# Patient Record
Sex: MALE | Race: WHITE | NOT HISPANIC OR LATINO | URBAN - METROPOLITAN AREA
[De-identification: names, ages, dates, MRNs, and addresses within clinical notes are randomized per-mention and may not be internally consistent; named-entity substitution may affect disease eponyms.]

---

## 2017-12-04 ENCOUNTER — EMERGENCY (EMERGENCY)
Facility: HOSPITAL | Age: 42
LOS: 1 days | End: 2017-12-04
Attending: EMERGENCY MEDICINE
Payer: MEDICAID

## 2017-12-04 VITALS
OXYGEN SATURATION: 99 % | HEART RATE: 96 BPM | TEMPERATURE: 98 F | WEIGHT: 175.93 LBS | HEIGHT: 74 IN | DIASTOLIC BLOOD PRESSURE: 75 MMHG | SYSTOLIC BLOOD PRESSURE: 105 MMHG | RESPIRATION RATE: 20 BRPM

## 2017-12-04 LAB
ALBUMIN SERPL ELPH-MCNC: 4.5 G/DL — SIGNIFICANT CHANGE UP (ref 3.3–5.2)
ALP SERPL-CCNC: 66 U/L — SIGNIFICANT CHANGE UP (ref 40–120)
ALT FLD-CCNC: 22 U/L — SIGNIFICANT CHANGE UP
AMPHET UR-MCNC: POSITIVE
ANION GAP SERPL CALC-SCNC: 14 MMOL/L — SIGNIFICANT CHANGE UP (ref 5–17)
APAP SERPL-MCNC: <7.5 UG/ML — LOW (ref 10–26)
APPEARANCE UR: CLEAR — SIGNIFICANT CHANGE UP
AST SERPL-CCNC: 19 U/L — SIGNIFICANT CHANGE UP
BACTERIA # UR AUTO: ABNORMAL
BARBITURATES UR SCN-MCNC: NEGATIVE — SIGNIFICANT CHANGE UP
BASOPHILS # BLD AUTO: 0 K/UL — SIGNIFICANT CHANGE UP (ref 0–0.2)
BASOPHILS NFR BLD AUTO: 0.5 % — SIGNIFICANT CHANGE UP (ref 0–2)
BENZODIAZ UR-MCNC: NEGATIVE — SIGNIFICANT CHANGE UP
BILIRUB SERPL-MCNC: 0.6 MG/DL — SIGNIFICANT CHANGE UP (ref 0.4–2)
BILIRUB UR-MCNC: NEGATIVE — SIGNIFICANT CHANGE UP
BUN SERPL-MCNC: 18 MG/DL — SIGNIFICANT CHANGE UP (ref 8–20)
CALCIUM SERPL-MCNC: 10 MG/DL — SIGNIFICANT CHANGE UP (ref 8.6–10.2)
CHLORIDE SERPL-SCNC: 100 MMOL/L — SIGNIFICANT CHANGE UP (ref 98–107)
CO2 SERPL-SCNC: 29 MMOL/L — SIGNIFICANT CHANGE UP (ref 22–29)
COCAINE METAB.OTHER UR-MCNC: POSITIVE
COLOR SPEC: YELLOW — SIGNIFICANT CHANGE UP
CREAT SERPL-MCNC: 1.27 MG/DL — SIGNIFICANT CHANGE UP (ref 0.5–1.3)
DIFF PNL FLD: NEGATIVE — SIGNIFICANT CHANGE UP
EOSINOPHIL # BLD AUTO: 0.2 K/UL — SIGNIFICANT CHANGE UP (ref 0–0.5)
EOSINOPHIL NFR BLD AUTO: 3 % — SIGNIFICANT CHANGE UP (ref 0–5)
EPI CELLS # UR: SIGNIFICANT CHANGE UP
ETHANOL SERPL-MCNC: <10 MG/DL — SIGNIFICANT CHANGE UP
GLUCOSE SERPL-MCNC: 98 MG/DL — SIGNIFICANT CHANGE UP (ref 70–115)
GLUCOSE UR QL: NEGATIVE MG/DL — SIGNIFICANT CHANGE UP
HCT VFR BLD CALC: 51 % — SIGNIFICANT CHANGE UP (ref 42–52)
HGB BLD-MCNC: 17.3 G/DL — SIGNIFICANT CHANGE UP (ref 14–18)
KETONES UR-MCNC: ABNORMAL
LEUKOCYTE ESTERASE UR-ACNC: ABNORMAL
LYMPHOCYTES # BLD AUTO: 1.6 K/UL — SIGNIFICANT CHANGE UP (ref 1–4.8)
LYMPHOCYTES # BLD AUTO: 24.5 % — SIGNIFICANT CHANGE UP (ref 20–55)
MCHC RBC-ENTMCNC: 30.1 PG — SIGNIFICANT CHANGE UP (ref 27–31)
MCHC RBC-ENTMCNC: 33.9 G/DL — SIGNIFICANT CHANGE UP (ref 32–36)
MCV RBC AUTO: 88.9 FL — SIGNIFICANT CHANGE UP (ref 80–94)
METHADONE UR-MCNC: NEGATIVE — SIGNIFICANT CHANGE UP
MONOCYTES # BLD AUTO: 0.7 K/UL — SIGNIFICANT CHANGE UP (ref 0–0.8)
MONOCYTES NFR BLD AUTO: 11.5 % — HIGH (ref 3–10)
NEUTROPHILS # BLD AUTO: 3.8 K/UL — SIGNIFICANT CHANGE UP (ref 1.8–8)
NEUTROPHILS NFR BLD AUTO: 60.3 % — SIGNIFICANT CHANGE UP (ref 37–73)
NITRITE UR-MCNC: NEGATIVE — SIGNIFICANT CHANGE UP
OPIATES UR-MCNC: POSITIVE
PCP SPEC-MCNC: SIGNIFICANT CHANGE UP
PCP UR-MCNC: NEGATIVE — SIGNIFICANT CHANGE UP
PH UR: 6 — SIGNIFICANT CHANGE UP (ref 5–8)
PLATELET # BLD AUTO: 193 K/UL — SIGNIFICANT CHANGE UP (ref 150–400)
POTASSIUM SERPL-MCNC: 4.9 MMOL/L — SIGNIFICANT CHANGE UP (ref 3.5–5.3)
POTASSIUM SERPL-SCNC: 4.9 MMOL/L — SIGNIFICANT CHANGE UP (ref 3.5–5.3)
PROT SERPL-MCNC: 7.5 G/DL — SIGNIFICANT CHANGE UP (ref 6.6–8.7)
PROT UR-MCNC: NEGATIVE MG/DL — SIGNIFICANT CHANGE UP
RBC # BLD: 5.74 M/UL — SIGNIFICANT CHANGE UP (ref 4.6–6.2)
RBC # FLD: 13.1 % — SIGNIFICANT CHANGE UP (ref 11–15.6)
RBC CASTS # UR COMP ASSIST: SIGNIFICANT CHANGE UP /HPF (ref 0–4)
SALICYLATES SERPL-MCNC: <2 MG/DL — LOW (ref 10–20)
SODIUM SERPL-SCNC: 143 MMOL/L — SIGNIFICANT CHANGE UP (ref 135–145)
SP GR SPEC: 1.02 — SIGNIFICANT CHANGE UP (ref 1.01–1.02)
THC UR QL: POSITIVE
TSH SERPL-MCNC: 0.68 UIU/ML — SIGNIFICANT CHANGE UP (ref 0.27–4.2)
UROBILINOGEN FLD QL: NEGATIVE MG/DL — SIGNIFICANT CHANGE UP
WBC # BLD: 6.3 K/UL — SIGNIFICANT CHANGE UP (ref 4.8–10.8)
WBC # FLD AUTO: 6.3 K/UL — SIGNIFICANT CHANGE UP (ref 4.8–10.8)
WBC UR QL: SIGNIFICANT CHANGE UP

## 2017-12-04 PROCEDURE — 12001 RPR S/N/AX/GEN/TRNK 2.5CM/<: CPT

## 2017-12-04 PROCEDURE — 99285 EMERGENCY DEPT VISIT HI MDM: CPT | Mod: 25

## 2017-12-04 PROCEDURE — 93010 ELECTROCARDIOGRAM REPORT: CPT

## 2017-12-04 PROCEDURE — 99285 EMERGENCY DEPT VISIT HI MDM: CPT

## 2017-12-04 RX ORDER — ONDANSETRON 8 MG/1
8 TABLET, FILM COATED ORAL EVERY 6 HOURS
Qty: 0 | Refills: 0 | Status: DISCONTINUED | OUTPATIENT
Start: 2017-12-04 | End: 2017-12-08

## 2017-12-04 RX ORDER — LOPERAMIDE HCL 2 MG
2 TABLET ORAL EVERY 6 HOURS
Qty: 0 | Refills: 0 | Status: DISCONTINUED | OUTPATIENT
Start: 2017-12-04 | End: 2017-12-08

## 2017-12-04 RX ORDER — IBUPROFEN 200 MG
600 TABLET ORAL ONCE
Qty: 0 | Refills: 0 | Status: COMPLETED | OUTPATIENT
Start: 2017-12-04 | End: 2017-12-04

## 2017-12-04 RX ORDER — IBUPROFEN 200 MG
400 TABLET ORAL EVERY 4 HOURS
Qty: 0 | Refills: 0 | Status: DISCONTINUED | OUTPATIENT
Start: 2017-12-04 | End: 2017-12-08

## 2017-12-04 RX ORDER — TRAZODONE HCL 50 MG
50 TABLET ORAL ONCE
Qty: 0 | Refills: 0 | Status: COMPLETED | OUTPATIENT
Start: 2017-12-04 | End: 2017-12-05

## 2017-12-04 RX ADMIN — Medication 0.1 MILLIGRAM(S): at 23:58

## 2017-12-04 RX ADMIN — Medication 600 MILLIGRAM(S): at 15:38

## 2017-12-04 RX ADMIN — Medication 600 MILLIGRAM(S): at 16:38

## 2017-12-04 NOTE — ED STATDOCS - CARDIAC, MLM
2+ radial ulnar pulses, cap refill <2 sec normal rate, regular rhythm, and no murmur. 2+ radial and ulnar pulses

## 2017-12-04 NOTE — ED ADULT NURSE NOTE - OBJECTIVE STATEMENT
Pt states he was feeling suicidal today and took a knife to his left wrist, Pt admits to doing heroin yesterday. Pt states he was feeling suicidal today and took a knife to his left wrist, Pt admits to doing opiates ("pain pills") yesterday.

## 2017-12-04 NOTE — ED STATDOCS - MEDICAL DECISION MAKING DETAILS
Pt with superficial laceration. Will apply sutures and send to  for evaluation. Pt with superficial laceration, repaired. CL:EARED MEDICALLY FOR PSYCH ADMISSION. To be admitted for SI Pt with superficial laceration, repaired. CLEARED MEDICALLY FOR PSYCH ADMISSION. To be admitted for SI

## 2017-12-04 NOTE — ED STATDOCS - OBJECTIVE STATEMENT
41 y/o M pt with hx of depression presents to the ED with c/o SI. Pt has self inflicted laceration on left wrist which occurred this morning. Pt uses opiates (heroine) and last time used was yesterday afternoon. No recent EtOH use. Pt has been "feeling down lately". Denies HI. No further complaints at this time. 43 y/o M pt with hx of depression presents to the ED with c/o SI. Pt reports long standing Hx of depression that has been worsening for several weeks. Pt has self inflicted laceration on left wrist which occurred this morning. Pt uses opiates (heroine) and last time used was yesterday afternoon. No recent EtOH use. Denies HI. No further complaints at this time.

## 2017-12-04 NOTE — ED STATDOCS - SKIN, MLM
2 cm superficial laceration to flexor aspect of left wrist, no evidence of tendon involvement, skin normal color for race, warm, dry and intact.

## 2017-12-04 NOTE — ED BEHAVIORAL HEALTH NOTE - BEHAVIORAL HEALTH NOTE
Patient is a 42  year old, twice   male; domiciled with mother; shares custody of two sons; works in construction;  PPH of reported borderline personality disorder; no prior psychiatric hospitalizations; no known prior suicide attempts; ; h/o opioid and cannabis abuse ; no significant PMH;  brought in by girlfriend after cutting L wrist as a suicide attempt.      Patient reports stressors related to finances, living situation, his current physical condition and other problems related to kids.  He reported feeling overwhelmed and "sick of it all" and while at his girfriends house was cutting his wrist with an intention to end his life.  He reported knife was dull. He started cutting but when girlfriend entered room he stopped.  He continued cutting when she left but the she came back in and noticed all the blood and brought him to ER.  Patient admits that he has been depressed for the last two months.  He reports this am he felt overwhelmed and feels he is in "such a hole and that he cant' get out".  He reports he has been depressed for a long time but the last two months are much worse.  He reports consistently depressed mood, lack of motivation, difficulty concentrating as he focuses on all of his problems, decreased energy and feelings of hopelessness and helplessness. He admits that a few months ago he had been preoccupied by thoughts of killing himself of denies that he acted on these thoughts.        He reports he uses opioids on a daily basis, and last used 2-3 bags of heroine yesterday, a couple of puffs of marijuana and 20 mg of adderall. He usually uses "roxies". He reports that the longest he has been sober has been two months.  He admits that substances has caused all kinds of problems.  He reports that while at this time he does not have active suicidal ideation, he feels the same way he did in the morning.  He is unable to contract for safety and feels he needs help.      Concerning other psychiatric symptoms, pt denies  any aggressive or violent behavior towards others. Pt denies any episodes of bizarre happiness, unusual energy, unusual nightime excitation or other common symptoms of randi occuring outside the context of drug use .. Pt denies hearing voices or seeing things.  No delusions were elicited.  Patient denies pervasive anxiety,  panic attacks, obsessions or compulsions.       ANNALISE Acharya, spoke with girlfriend who was concerned about his safety, felt he was a suicide risk if he is discharged and felt that patient has not been acting like himself lately ([place] [phone]). Patient is considered a high risk to self at this time and requires inpatient psychiatric hospitalizations.  Patient is willling to sign in voluntarily. He is currently not in psychiatric treatment and taking no psychiatric medications.  No current evidence of withdrawal symptoms.  VSS. Will look for area hospitals for transfer.

## 2017-12-04 NOTE — ED BEHAVIORAL HEALTH NOTE - BEHAVIORAL HEALTH NOTE
SWNote: pt seen by psych MD(Dr Navarro) found to benefit from inpatient hospitalization . No beds available tonight. Worker faxed all paperwork to [facility] to review for possible beds availability in the am. SW to follow. SWNote: pt seen by psych MD(Dr Navarro) found to benefit from inpatient hospitalization,on a voluntary status . No beds available tonight. Worker faxed all paperwork to [facility] to review for possible beds availability in the am. SW to follow.

## 2017-12-05 ENCOUNTER — INPATIENT (INPATIENT)
Facility: HOSPITAL | Age: 42
LOS: 6 days | Discharge: HOME | End: 2017-12-12
Attending: PSYCHIATRY & NEUROLOGY | Admitting: PSYCHIATRY & NEUROLOGY
Payer: MEDICAID

## 2017-12-05 VITALS — WEIGHT: 177.91 LBS | TEMPERATURE: 98 F | HEIGHT: 74 IN

## 2017-12-05 VITALS
DIASTOLIC BLOOD PRESSURE: 76 MMHG | RESPIRATION RATE: 18 BRPM | OXYGEN SATURATION: 98 % | TEMPERATURE: 98 F | HEART RATE: 85 BPM | SYSTOLIC BLOOD PRESSURE: 115 MMHG

## 2017-12-05 DIAGNOSIS — F39 UNSPECIFIED MOOD [AFFECTIVE] DISORDER: ICD-10-CM

## 2017-12-05 DIAGNOSIS — F32.9 MAJOR DEPRESSIVE DISORDER, SINGLE EPISODE, UNSPECIFIED: ICD-10-CM

## 2017-12-05 PROCEDURE — 80307 DRUG TEST PRSMV CHEM ANLYZR: CPT

## 2017-12-05 PROCEDURE — 12001 RPR S/N/AX/GEN/TRNK 2.5CM/<: CPT | Mod: LT

## 2017-12-05 PROCEDURE — 84443 ASSAY THYROID STIM HORMONE: CPT

## 2017-12-05 PROCEDURE — 99222 1ST HOSP IP/OBS MODERATE 55: CPT

## 2017-12-05 PROCEDURE — 81001 URINALYSIS AUTO W/SCOPE: CPT

## 2017-12-05 PROCEDURE — 99285 EMERGENCY DEPT VISIT HI MDM: CPT | Mod: 25

## 2017-12-05 PROCEDURE — 93005 ELECTROCARDIOGRAM TRACING: CPT

## 2017-12-05 PROCEDURE — 80053 COMPREHEN METABOLIC PANEL: CPT

## 2017-12-05 PROCEDURE — 36415 COLL VENOUS BLD VENIPUNCTURE: CPT

## 2017-12-05 PROCEDURE — 85027 COMPLETE CBC AUTOMATED: CPT

## 2017-12-05 RX ORDER — LOPERAMIDE HCL 2 MG
2 TABLET ORAL EVERY 4 HOURS
Qty: 0 | Refills: 0 | Status: DISCONTINUED | OUTPATIENT
Start: 2017-12-05 | End: 2017-12-12

## 2017-12-05 RX ORDER — HALOPERIDOL DECANOATE 100 MG/ML
5 INJECTION INTRAMUSCULAR EVERY 8 HOURS
Qty: 0 | Refills: 0 | Status: DISCONTINUED | OUTPATIENT
Start: 2017-12-05 | End: 2017-12-12

## 2017-12-05 RX ORDER — LANOLIN ALCOHOL/MO/W.PET/CERES
3 CREAM (GRAM) TOPICAL AT BEDTIME
Qty: 0 | Refills: 0 | Status: DISCONTINUED | OUTPATIENT
Start: 2017-12-05 | End: 2017-12-12

## 2017-12-05 RX ORDER — HYDROXYZINE HCL 10 MG
50 TABLET ORAL EVERY 4 HOURS
Qty: 0 | Refills: 0 | Status: DISCONTINUED | OUTPATIENT
Start: 2017-12-05 | End: 2017-12-12

## 2017-12-05 RX ADMIN — Medication 2 MILLIGRAM(S): at 20:42

## 2017-12-05 RX ADMIN — Medication 3 MILLIGRAM(S): at 21:00

## 2017-12-05 RX ADMIN — Medication 400 MILLIGRAM(S): at 13:12

## 2017-12-05 RX ADMIN — Medication 50 MILLIGRAM(S): at 20:42

## 2017-12-05 RX ADMIN — Medication 0.1 MILLIGRAM(S): at 05:58

## 2017-12-05 RX ADMIN — Medication 20 MILLIGRAM(S): at 06:02

## 2017-12-05 RX ADMIN — Medication 0.1 MILLIGRAM(S): at 20:42

## 2017-12-05 RX ADMIN — Medication 400 MILLIGRAM(S): at 04:47

## 2017-12-05 RX ADMIN — Medication 50 MILLIGRAM(S): at 04:47

## 2017-12-05 RX ADMIN — Medication 0.1 MILLIGRAM(S): at 13:48

## 2017-12-05 RX ADMIN — Medication 400 MILLIGRAM(S): at 05:08

## 2017-12-05 RX ADMIN — Medication 400 MILLIGRAM(S): at 11:16

## 2017-12-05 NOTE — ED BEHAVIORAL HEALTH ASSESSMENT NOTE - HPI (INCLUDE ILLNESS QUALITY, SEVERITY, DURATION, TIMING, CONTEXT, MODIFYING FACTORS, ASSOCIATED SIGNS AND SYMPTOMS)
Patient is a 42  year old, twice   male; domiciled with mother; shares custody of two sons; works in construction;  PPH of reported borderline personality disorder; no prior psychiatric hospitalizations; no known prior suicide attempts; ; h/o opioid and cannabis abuse ; no significant PMH;  brought in by girlfriend after cutting L wrist as a suicide attempt.      Patient reports stressors related to finances, living situation, his current physical condition and other problems related to kids.  He states his mother starting dating again, and he has been lacking sense of belongings. He is sick about worrying about withdrawal symptoms and reports that he feels his body is in decline.   He reported feeling overwhelmed and "sick of it all" and while at his girfriends house was cutting his wrist with an intention to end his life.  He reported knife was dull. He started cutting but when girlfriend entered room he stopped.  He continued cutting when she left but the she came back in and noticed all the blood and brought him to ER.  Patient admits that he has been depressed for the last two months.  He reports this am he felt overwhelmed and feels he is in "such a hole and that he cant' get out".  He reports he has been depressed for a long time but the last two months are much worse.  He reports consistently depressed mood, lack of motivation, difficulty concentrating as he focuses on all of his problems, decreased energy and feelings of hopelessness and helplessness. He admits that a few months ago he had been preoccupied by thoughts of killing himself of denies that he acted on these thoughts.        He reports he uses opioids on a daily basis, and last used 2-3 bags of heroine yesterday, a couple of puffs of marijuana and 20 mg of adderall. He usually uses "roxies". He reports that the longest he has been sober has been two months.  He admits that substances has caused all kinds of problems.  He reports that while at this time he does not have active suicidal ideation, he feels the same way he did in the morning.  He is unable to contract for safety and feels he needs help.      Concerning other psychiatric symptoms, pt denies  any aggressive or violent behavior towards others. Pt denies any episodes of bizarre happiness, unusual energy, unusual nightime excitation or other common symptoms of randi occuring outside the context of drug use .. Pt denies hearing voices or seeing things.  No delusions were elicited.  Patient denies pervasive anxiety,  panic attacks, obsessions or compulsions.       Marck RN, spoke with girlfriend who was concerned about his safety, felt he was a suicide risk if he is discharged and felt that patient has not been acting like himself lately ([place] [phone]). Patient is considered a high risk to self at this time and requires inpatient psychiatric hospitalizations.  Patient is willling to sign in voluntarily. He is currently not in psychiatric treatment and taking no psychiatric medications.  No current evidence of withdrawal symptoms.  VSS. Will look for area hospitals for transfer.

## 2017-12-05 NOTE — ED BEHAVIORAL HEALTH ASSESSMENT NOTE - DESCRIPTION (FIRST USE, LAST USE, QUANTITY, FREQUENCY, DURATION)
reports frequent use reports he has been using since age 38,longest period of abstinence 2 months, prefers pills and uses heroine if he can''t obtain

## 2017-12-05 NOTE — ED BEHAVIORAL HEALTH ASSESSMENT NOTE - OTHER PAST PSYCHIATRIC HISTORY (INCLUDE DETAILS REGARDING ONSET, COURSE OF ILLNESS, INPATIENT/OUTPATIENT TREATMENT)
Denies prior hospitalizations or prior suicide attempts. Reports trial of Wellbutrin and Sertraline in the past. Has seen outpatient psychiatrist for several months.  Usually stops medications on his own. Has been diagnosed with borderline personality disorder.  He states "I've read the criteria, I meet practically all of them to a T".

## 2017-12-05 NOTE — ED ADULT NURSE REASSESSMENT NOTE - NS ED NURSE REASSESS COMMENT FT1
Pt currently resting/sleeping comfortably. Pt in no apparent distress. Safety maintained. Will continue to monitor the pt for safety.
Patient presents to  with self inflicted wound to L wrist in what patient admits was a suicidal gesture. Reports he was diagnosed "years ago" with borderline personality disorder. Stated he has not been "feeling right", like he was "broken inside" which he reports intensified yesterday. Stated he felt overwhelmed today and cut his wrist. Significant other walked in on patient. Patient admits he is often deceptive during interactions with caregivers and others. Reports he can seem fine "on the outside", but it's just to mask how he is really feeling. Stated he has never been admitted inpatient, and has also never been on medications. Stated he has been experiencing these symptoms since his teens, at least. Currently denied suicidal/homicidal ideation. Patient reports previous withdrawal symptoms from opiates, which he reports he most recently took last night. Cooperative with intake interview and assessment. Continuing to monitor and assess.

## 2017-12-05 NOTE — ED ADULT NURSE REASSESSMENT NOTE - CONDITION
unchanged/Pt remains in bed appears more comfortable than this am. Catapres 0.1mg given. Pt ate some breakfast and lunch, encouraged to increase fluids . Pt sleeping ad rosie.  Reports generalized discomfort at times. Given Motrin this  am. with good results

## 2017-12-05 NOTE — SBIRT NOTE. - NSSBIRTSERVICES_GEN_A_ED
Referral to Treatment Provided/Full Screen ONLY/Brief Intervention Performed/Smoking Cessation Information Provided

## 2017-12-05 NOTE — CHART NOTE - NSCHARTNOTEFT_GEN_A_CORE
Patient Admitted from: [place] [facility]     [facility] admitting diagnosis: Episodic mood disorder    PAST MEDICAL & SURGICAL HISTORY:  Depression (emotion)        Allergies    No Known Allergies    Intolerances        Social History:     FAMILY HISTORY:      MEDICATIONS  (STANDING):    MEDICATIONS  (PRN):  cloNIDine 0.1 milliGRAM(s) Oral every 4 hours PRN Withdrawal symptoms  haloperidol    Injectable 5 milliGRAM(s) IntraMuscular every 8 hours PRN agitation  hydrOXYzine hydrochloride 50 milliGRAM(s) Oral every 4 hours PRN Anxiety  loperamide 2 milliGRAM(s) Oral every 4 hours PRN After each loose stool as needed for diarrhea  melatonin. 3 milliGRAM(s) Oral at bedtime PRN Insomnia        CAPILLARY BLOOD GLUCOSE        I&O's Summary      PHYSICAL EXAM:  GENERAL: NAD, well-developed  HEAD:  Atraumatic, Normocephalic  EYES: EOMI, PERRLA, conjunctiva and sclera clear  CHEST/LUNG: Clear to auscultation bilaterally  HEART: Regular rate and rhythm; No murmurs, rubs, or gallops  ABDOMEN: Soft, Nontender, Nondistended, Normal bowel sounds   EXTREMITIES:  2+ Peripheral Pulses, No clubbing, cyanosis, or edema  PSYCH: AAOx3  NEUROLOGY: CN 2-12 intact   SKIN: Gauze bandages on L wrist     Vital Signs Last 24 Hrs  T(C): 36.4 (05 Dec 2017 18:00), Max: 36.9 (05 Dec 2017 07:44)  T(F): 97.6 (05 Dec 2017 18:00), Max: 98.4 (05 Dec 2017 07:44)  HR: 98 (05 Dec 2017 18:00) (63 - 98)  BP: 112/78 (05 Dec 2017 18:00) (100/67 - 124/73)  BP(mean): --  RR: 18 (05 Dec 2017 14:56) (18 - 19)  SpO2: 98% (05 Dec 2017 14:56) (97% - 99%)    LABS:                        17.3   6.3   )-----------( 193      ( 04 Dec 2017 15:11 )             51.0     12-    143  |  100  |  18.0  ----------------------------<  98  4.9   |  29.0  |  1.27    Ca    10.0      04 Dec 2017 15:11    TPro  7.5  /  Alb  4.5  /  TBili  0.6  /  DBili  x   /  AST  19  /  ALT  22  /  AlkPhos  66  12-04          Urinalysis Basic - ( 04 Dec 2017 18:51 )    Color: Yellow / Appearance: Clear / S.020 / pH: x  Gluc: x / Ketone: Trace  / Bili: Negative / Urobili: Negative mg/dL   Blood: x / Protein: Negative mg/dL / Nitrite: Negative   Leuk Esterase: Trace / RBC: 0-2 /HPF / WBC 0-2   Sq Epi: x / Non Sq Epi: Occasional / Bacteria: Occasional        RADIOLOGY & ADDITIONAL TESTS:    Assessment and Plan: 42 M with no pertinent PMH and a psychiatry h/o Episodic mood disorder. He is on CINA for amphetamine, opiate, cocaine, and THC withdrawal and positive toxicology screen. His last CINA score was 3. He only admits to feeling "jittery" but denies tremors, SOB, chest pain, chills, abdominal pain, n/v/d/c. His screening was remarkable for a bandaged, sutures, laceration on the L wrist due which was self induced with a knife. As per the patient, he only received 3 stiches yesterday (17).  1. Episodic mood disorder: Continue treatment as per primary psychiatry team: Haloperidol and hydroxyzine PRN  2. Withdrawal: Continue clonidine .1mg Q4hrs, PRN and loperamide 2mg Q4hrs PRN

## 2017-12-05 NOTE — ED BEHAVIORAL HEALTH ASSESSMENT NOTE - SUICIDE RISK FACTORS
Hopelessness/Mood episode/Highly impulsive behavior/Substance abuse/dependence/Unable to engage in safety planning/Access to means (pills, firearms, etc.)

## 2017-12-05 NOTE — ED BEHAVIORAL HEALTH ASSESSMENT NOTE - DIFFERENTIAL
Depressive disorder, opioid use disorder  r/o MDD without psychotic features,  r/o substance induced mood disorder r/o borderline personality disorder

## 2017-12-05 NOTE — SBIRT NOTE. - NSSBIRTSERVICES_GEN_A_ED_FT
Naloxone Rescue Kit dispensed: Pt was educated about Naloxone and trained on how to assemble and utilize the kit. [id number]   Provided SBIRT services: Full screen positive. Referral to Treatment Performed. Screening results were reviewed with the patient and patient was provided information about healthy guidelines and potential negative consequences associated with level of risk. Motivation and readiness to reduce or stop use was discussed and goals and activities to make changes were suggested/offered.  Referral for complete assessment and level of care determination at a certified treatment facility was completed by giving the patient information for treatment facilities that met their needs and encouraging them to call for an appointment. A call was not made to a facility because  Referral already made by another staff member -    Audit Score: 4  DAST Score: 6  Duration = 25 Minutes

## 2017-12-05 NOTE — ED ADULT NURSE REASSESSMENT NOTE - COMFORT CARE
darkened lights/po fluids offered
po fluids offered/warm blanket provided/meal provided/side rails down/wait time explained
side rails up

## 2017-12-05 NOTE — ED BEHAVIORAL HEALTH NOTE - BEHAVIORAL HEALTH NOTE
Social work note: Pt evaluated by psychiatrist.  At this time pt would benefit from inpatient hospitalization.  9.13 paperwork completed by pt. No beds available at [facility] or [facility].  Worker spoke to Anni from [facility].  Clinical reviewed and pt accepted to [facility] under the care of Dr. Golden.  No authorization needed as pt is uninsured. Pt, RN, and MD aware of transfer. Transportation arranged. No other social work needs at this time.

## 2017-12-05 NOTE — ED BEHAVIORAL HEALTH ASSESSMENT NOTE - DESCRIPTION
Patient was calm, with bandage over L wrist (required 3 stitches). No acute withdrawal symptoms were evident.  UDS +THC, +cocaine, +amphetamine +opioid    Vital Signs Last 24 Hrs  T(C): 36.9 (04 Dec 2017 20:01), Max: 36.9 (04 Dec 2017 13:45)  T(F): 98.4 (04 Dec 2017 20:01), Max: 98.5 (04 Dec 2017 13:45)  HR: 69 (04 Dec 2017 20:01) (69 - 96)  BP: 115/71 (04 Dec 2017 20:01) (105/75 - 115/71)  BP(mean): --  RR: 18 (04 Dec 2017 20:01) (18 - 20)  SpO2: 98% (04 Dec 2017 20:01) (98% - 99%) Denies Patient was born in [facility].  He has older brother and sister and younger brother. He graduated from . Was an amateur boxer.  Has  twice and has two sons ages 17, and 8 from two marriages. currently

## 2017-12-05 NOTE — ED BEHAVIORAL HEALTH ASSESSMENT NOTE - SUMMARY
Patient is a 42  year old, twice   male; domiciled with mother; shares custody of two sons; works in construction;  PPH of reported borderline personality disorder; no prior psychiatric hospitalizations; no known prior suicide attempts; ; h/o opioid and cannabis abuse ; no significant PMH;  brought in by girlfriend after cutting L wrist as a suicide attempt.  Patient symptoms of depresive disorder along with rumination about ending his life and deliberate suicide attempt by cutting his L wrist with a knife. Given his substance use a substance induced mood disorder needs to be considered in differential.  Patient also reports opioid abuse and UDS was positive for cocaine, THC, and amphetamines.  He reported that he used adderall 30 mg yesterday, along with several bags of heroine, and took a couple of hits of "week".  He appeared to minimize drug use at first.  He did not disclose cocaine use. Girlfriend is concerned about pt's safety and notes a decline in his behavior. No psychotic or manic sx's were elicited.  Although he does not endorse active suicidality at this time, he continues to feel hopeless, and same states that let to suicidal attempt.  Patient is willing to sign himself voluntarily and requires inpatient psychiatric admission.

## 2017-12-05 NOTE — ED BEHAVIORAL HEALTH ASSESSMENT NOTE - RISK ASSESSMENT
High-Given recent stressors, suicide attempt, depressive sx's, impulsivity, substance use and few protective factors.

## 2017-12-06 PROCEDURE — 99233 SBSQ HOSP IP/OBS HIGH 50: CPT

## 2017-12-06 RX ORDER — METHADONE HYDROCHLORIDE 40 MG/1
40 TABLET ORAL ONCE
Qty: 0 | Refills: 0 | Status: DISCONTINUED | OUTPATIENT
Start: 2017-12-06 | End: 2017-12-06

## 2017-12-06 RX ADMIN — METHADONE HYDROCHLORIDE 40 MILLIGRAM(S): 40 TABLET ORAL at 11:39

## 2017-12-07 PROCEDURE — 90853 GROUP PSYCHOTHERAPY: CPT

## 2017-12-07 PROCEDURE — 99232 SBSQ HOSP IP/OBS MODERATE 35: CPT | Mod: 25

## 2017-12-07 PROCEDURE — 90832 PSYTX W PT 30 MINUTES: CPT | Mod: 59

## 2017-12-07 RX ORDER — METHADONE HYDROCHLORIDE 40 MG/1
30 TABLET ORAL ONCE
Qty: 0 | Refills: 0 | Status: DISCONTINUED | OUTPATIENT
Start: 2017-12-07 | End: 2017-12-07

## 2017-12-07 RX ADMIN — METHADONE HYDROCHLORIDE 30 MILLIGRAM(S): 40 TABLET ORAL at 12:18

## 2017-12-07 RX ADMIN — Medication 3 MILLIGRAM(S): at 21:23

## 2017-12-08 PROCEDURE — 99232 SBSQ HOSP IP/OBS MODERATE 35: CPT

## 2017-12-08 RX ORDER — METHADONE HYDROCHLORIDE 40 MG/1
20 TABLET ORAL ONCE
Qty: 0 | Refills: 0 | Status: DISCONTINUED | OUTPATIENT
Start: 2017-12-08 | End: 2017-12-08

## 2017-12-08 RX ORDER — METHADONE HYDROCHLORIDE 40 MG/1
10 TABLET ORAL DAILY
Qty: 0 | Refills: 0 | Status: DISCONTINUED | OUTPATIENT
Start: 2017-12-09 | End: 2017-12-10

## 2017-12-08 RX ADMIN — Medication 0.1 MILLIGRAM(S): at 21:00

## 2017-12-08 RX ADMIN — Medication 3 MILLIGRAM(S): at 21:00

## 2017-12-08 RX ADMIN — Medication 50 MILLIGRAM(S): at 21:00

## 2017-12-08 RX ADMIN — METHADONE HYDROCHLORIDE 20 MILLIGRAM(S): 40 TABLET ORAL at 09:53

## 2017-12-09 PROCEDURE — 99232 SBSQ HOSP IP/OBS MODERATE 35: CPT

## 2017-12-09 RX ADMIN — Medication 3 MILLIGRAM(S): at 21:17

## 2017-12-09 RX ADMIN — Medication 50 MILLIGRAM(S): at 21:18

## 2017-12-09 RX ADMIN — METHADONE HYDROCHLORIDE 10 MILLIGRAM(S): 40 TABLET ORAL at 09:22

## 2017-12-10 PROCEDURE — 99232 SBSQ HOSP IP/OBS MODERATE 35: CPT

## 2017-12-10 RX ADMIN — METHADONE HYDROCHLORIDE 10 MILLIGRAM(S): 40 TABLET ORAL at 08:56

## 2017-12-10 RX ADMIN — Medication 50 MILLIGRAM(S): at 16:05

## 2017-12-10 RX ADMIN — Medication 50 MILLIGRAM(S): at 21:21

## 2017-12-10 RX ADMIN — Medication 3 MILLIGRAM(S): at 21:22

## 2017-12-11 VITALS
OXYGEN SATURATION: 100 % | TEMPERATURE: 98 F | RESPIRATION RATE: 18 BRPM | DIASTOLIC BLOOD PRESSURE: 68 MMHG | SYSTOLIC BLOOD PRESSURE: 106 MMHG

## 2017-12-11 PROCEDURE — 99238 HOSP IP/OBS DSCHRG MGMT 30/<: CPT

## 2017-12-11 RX ORDER — METHADONE HYDROCHLORIDE 40 MG/1
5 TABLET ORAL ONCE
Qty: 0 | Refills: 0 | Status: DISCONTINUED | OUTPATIENT
Start: 2017-12-11 | End: 2017-12-11

## 2017-12-11 RX ADMIN — Medication 50 MILLIGRAM(S): at 09:03

## 2017-12-11 RX ADMIN — Medication 50 MILLIGRAM(S): at 13:25

## 2017-12-11 RX ADMIN — Medication 3 MILLIGRAM(S): at 21:36

## 2017-12-11 RX ADMIN — METHADONE HYDROCHLORIDE 5 MILLIGRAM(S): 40 TABLET ORAL at 09:03

## 2017-12-11 RX ADMIN — Medication 50 MILLIGRAM(S): at 21:36

## 2017-12-11 NOTE — CHART NOTE - NSCHARTNOTEFT_GEN_A_CORE
[facility] 41 [facility] on distal left anterior wrist. Wound is well healed with no signs of infection, drainage, abscess, erythema, or edema. Wound was cleaned with alcohol pre and post suture removal and no dressing or bandage was placed.   If noticed any signs of infection such as new redness, swelling, drainage, increased pain, wound reopening or bleeding. Pt told to contact medical team for further care.

## 2017-12-12 PROCEDURE — 99238 HOSP IP/OBS DSCHRG MGMT 30/<: CPT

## 2017-12-12 RX ORDER — ACETAMINOPHEN 500 MG
650 TABLET ORAL ONCE
Qty: 0 | Refills: 0 | Status: COMPLETED | OUTPATIENT
Start: 2017-12-12 | End: 2017-12-12

## 2017-12-12 RX ORDER — IBUPROFEN 200 MG
400 TABLET ORAL EVERY 4 HOURS
Qty: 0 | Refills: 0 | Status: DISCONTINUED | OUTPATIENT
Start: 2017-12-12 | End: 2017-12-12

## 2017-12-12 RX ADMIN — Medication 650 MILLIGRAM(S): at 04:40

## 2017-12-12 RX ADMIN — Medication 400 MILLIGRAM(S): at 10:38

## 2017-12-12 RX ADMIN — Medication 400 MILLIGRAM(S): at 12:14

## 2019-08-19 NOTE — ED BEHAVIORAL HEALTH ASSESSMENT NOTE - MODE OF ARRIVAL
Nicholas Swartz  68 y.o.  Chief Complaint   Patient presents with   • T-5000 FALL     tripped going down stairs, twisted both ankles, fells down 5-6 stairs   • Ankle Injury     bilat, left worse than right, CMS intact     Pt BIB EMS from [facility]. Pt has a c/o gong down some steep stairs and tripped.  Patient is visiting from out of town.  Mild swelling to right ankle, notable swelling and tenderness to left ankle, ice pack applied.  Only PMH is HTN.  Air splint removed on arrival.    PTA pt received zofran 4mg and fentanyl 100 mcg, PIV placed.    Pt ankle pain 2/10 at this time.    Changed into gown.  Chart up for ERP.     Walk in / drive in

## 2019-09-19 NOTE — ED ADULT NURSE NOTE - CHIEF COMPLAINT
- well controlled  - continue current medications   The patient is a 42y Male complaining of suicidal attempt.

## 2021-07-06 NOTE — ED ADULT NURSE NOTE - PAIN RATING/NUMBER SCALE (0-10): ACTIVITY
Consent (Temporal Branch)/Introductory Paragraph: The rationale for Mohs was explained to the patient and consent was obtained. The risks, benefits and alternatives to therapy were discussed in detail. Specifically, the risks of damage to the temporal branch of the facial nerve, infection, scarring, bleeding, prolonged wound healing, incomplete removal, allergy to anesthesia, and recurrence were addressed. Prior to the procedure, the treatment site was clearly identified and confirmed by the patient. All components of Universal Protocol/PAUSE Rule completed. 0

## 2023-01-18 NOTE — ED ADULT NURSE REASSESSMENT NOTE - GENERAL PATIENT STATE
Adult
anxious
no change observed/comfortable appearance/cooperative
resting/sleeping/comfortable appearance